# Patient Record
Sex: FEMALE | Race: WHITE | NOT HISPANIC OR LATINO | Employment: OTHER | URBAN - METROPOLITAN AREA
[De-identification: names, ages, dates, MRNs, and addresses within clinical notes are randomized per-mention and may not be internally consistent; named-entity substitution may affect disease eponyms.]

---

## 2022-08-04 ENCOUNTER — DOCUMENTATION ONLY (OUTPATIENT)
Dept: ONCOLOGY | Facility: CLINIC | Age: 67
End: 2022-08-04

## 2022-08-04 ENCOUNTER — PATIENT OUTREACH (OUTPATIENT)
Dept: ONCOLOGY | Facility: CLINIC | Age: 67
End: 2022-08-04

## 2022-08-04 ENCOUNTER — PRE VISIT (OUTPATIENT)
Dept: ONCOLOGY | Facility: CLINIC | Age: 67
End: 2022-08-04

## 2022-08-04 ENCOUNTER — TRANSCRIBE ORDERS (OUTPATIENT)
Dept: OTHER | Age: 67
End: 2022-08-04

## 2022-08-04 DIAGNOSIS — C50.919 BREAST CANCER (H): Primary | ICD-10-CM

## 2022-08-04 NOTE — PROGRESS NOTES
New Patient Oncology Nurse Navigator Note     Referring provider: Self     Referring Clinic/Organization: 2nd opinion      Referred to (specialty:) Medical Oncology and Cancer Surgery     Requested provider (if applicable): Dr. Domi Mcbride     Date Referral Received: August 4, 2022     Evaluation for:  Breast cancer     Clinical History (per Nurse review of records provided):      Patient had bilateral screening mammogram with [facility] on 7/23/22 and an asymmetry with indeterminate calcifications in the right breast at the 3 o'clock position at anterior depth. Diagnostic imaging followed on 7/27 and right breast tomosynthesis and magnification views confirm an irregular mass at 2:00 position anteriorly with pleomorphic calcifications. Additionally, mild architectural distortion at 12:00 position is noted more superior to the mass at 2:00 position.     Imaging directed right breast ultrasound demonstrates an irregular hypoechoic mass at 2:00 position subareolar measuring 1.7 x 1.1 x 1.4 cm. Approximately 1.3 cm superiorly and laterally, irregular hypoechoic tissue/mass at 12:00 position 2 cm from the nipple measures 0.5 x 0.3 x 0.4 cm, technically indeterminate. No right axillary adenopathy confirmed by ultrasound.      7/27/22 -   A.  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     B.  Breast, right 2:00, ultrasound guided needle core biopsies:    Invasive ductal carcinoma, grade 3 of 3.    Associated ductal carcinoma in Situ (DCIS), nuclear grade 3 of 3 with central comedonecrosis and microcalcifications.    Estrogen Receptor:         Negative          Progesterone Receptor:  Negative       HER2/sanjay - IHC:             Positive (Score 3+)    The larger tumor is DCIS.  The smaller tumor is IDC ER/IA negative HER2 positive. IDC was 0.5 cm on imaging.    8/3/22 - Bilateral Breast MRI  RIGHT BREAST:   1. There is a 4.1 x  3.1 x 2.8 cm (AP x TR x SI) distribution of abnormal mass and nonmass enhancement in the right breast 12:00-2:00 positions anterior to posterior depth at site of 2 site of biopsy-proven malignancy. The majority of this enhancement is nonmass enhancement and is suspicious for DCIS.   2. Within the abnormal enhancement is a 1.2 x 1.5 x 1.2 cm (AP x TR x SI) enhancing mass with washout kinetics 2:00 subareolar position with internal marking clip at site of invasive mass and DCIS.   3. Marking clip at the 12:00 position anterior depth brackets the anterior inferior extent of nonmass enhancement at site of biopsy-proven DCIS. The anterior inferior edge of the enhancement is approximately 1.3 cm from the nipple.   4. Extending posteriorly from the biopsy-proven malignant mass at the 2:00 position is a 1.5 cm length distribution of nonmass enhancement suspicious for DCIS.   5. No other suspicious enhancement.     LEFT BREAST:   1. There is a 0.4 x 0.4 x 0.5 cm (AP x TR x SI) markedly T2 hyperintense enhancing masslike finding with no discernible kinetics on CAD in the 9:30 o'clock retroareolar position middle depth.   2. Otherwise unremarkable.     1. Recommend second look ultrasound and spot tomosynthesis of the left breast with attention to the 9:30 retroareolar position middle depth. If a suspicious finding is not identified at this location, this enhancement could represent a benign focus of enhancement in 6 month follow-up breast MRI after therapy could be considered.   2. 2nd look biopsy Yes     8/10/22 -     A.  Breast, left, 12 o'clock ultrasound, needle core biopsy:      Fibroadenoma    After meeting with Dr. Liliam Uribe patient now is getting a medical oncology consult from Dr. Valdes as well.    Patient met with Syed Mosley of [facility] for genetic counseling on 8/11/22 and chose to proceed with testing via [facility]'s Breast Cancer STAT panel (9 genes) with reflex to complete the Multi-Cancer Panel (84 genes).  STAT results will be available within 2 weeks from date of blood draw with complete results available within 3 additional weeks. Results subsequently reported as follows:  Breast Cancer STAT panel (9 genes) with reflex to complete the Multi-Cancer Panel (84 genes) via [facility]. See test report for details regarding genes analyzed and testing methodologies.  RESULT: NEGATIVE FOR CLINICALLY-ACTIONABLE VARIANTS  No clinically-actionable (pathogenic) mutations were detected in the genes analyzed.  A variant of uncertain significance was detected in the RECQL4 gene. The specific variant identified was: c.1159G>A ([id number]). No laboratory classifies this variant as clinically-actionable in the national database ClinVar.     Records Location: Care Everywhere, Media and See Bookmarked material     Records Needed:   All breast imaging for past 5 years including 8/3/22 breast MRI   Path review for 7/27/22 right breast biopsies Case: [id number]    Telephoned and spoke with Janelle. A friend of hers is a patient of Dr. Mcbride and recommended her highly.  Patient met with Dr. Darlene Sánchez of [facility] on 8/4.  A breast MRI took place on 8/3.  She will be meeting with Dr. Liliam Uribe on 8/9.  Informed her of the first available appointments for Dr. Mcbride (8/29) and Dr. Dee (8/19). She asks to for Dr. Mcbride appointment to be moved up if at all possible. Per Dr. Uribe's 8/9 dictation, patient is also meeting with Dr. Valdes for medical oncology opinion (8/22) and Dr. Juan Avery for plastics on 8/18.    Writer received referral, reviewed for appropriate plan, and call transferred to New Patient Scheduling for completion.

## 2022-08-04 NOTE — TELEPHONE ENCOUNTER
Action 2022 3:21 PM ABT   Action Taken Slides from [facility] received and taken to 5th floor path lab for review.     Action 2022 1:49 PM ABT   Action Taken Images from  received, fax sent to  imaging team to resolve US and Mammos    3:21 PM  Mihaela from [facility] called and states that slides were sent to another facility on 22. May be a while til slides are sent here. Images from  resolved to PACS     Action 2022 9:20 AM ABT   Action Taken Called and spoke to patient, she states that she was seen with PN, had breast MR 22 and has onc appt 22. Pt gave verbal consent to update recs. Patient has only been dealing with issue for the last week     RECORDS STATUS - BREAST    RECORDS REQUESTED FROM: [facility]   DATE REQUESTED: 22   NOTES DETAILS STATUS   OFFICE NOTE from referring provider Self-2nd Opinion    OFFICE NOTE from medical oncologist -  22: Dr. Darlene Sánchez   MEDICATION LIST -    LABS     PATHOLOGY REPORTS  (Tissue diagnosis, Stage, ER/NM percentage positive and intensity of staining, HER2 IHC, FISH, and all biopsies from breast and any distant metastasis)                 Req -[facility]  [employer] 22: [id number]   IMAGING (NEED IMAGES & REPORT)     MRI PACS 22: MR Breast   MAMMO PACS 22-09   ULTRASOUND PACS 22: US Breast

## 2022-08-04 NOTE — PROGRESS NOTES
Action    Action Taken 8/4/2022 9:03AM DAVID   Warm Transfer From Scheduling     I spoke to Pt Janelle Ph: [phone]    I sent Naomi a teams message to reach out to pt.

## 2022-08-25 NOTE — TELEPHONE ENCOUNTER
Action 2022 12:50 PM ABT   Action Taken Slides from [facility] received and taken to 5th floor path lab for review     RECORDS STATUS - BREAST    RECORDS REQUESTED FROM: [facility]   DATE REQUESTED: 22   NOTES DETAILS STATUS   OFFICE NOTE from referring provider Self-2nd Opinion    OFFICE NOTE from medical oncologist [facility]/ 22: Dr. Janay Valdes  22: Dr. gavin titus   OFFICE NOTE from surgeon [facility] 22: Dr. Liliam Uribe   MEDICATION LIST [facility]    LABS     PATHOLOGY REPORTS  (Tissue diagnosis, Stage, ER/NC percentage positive and intensity of staining, HER2 IHC, FISH, and all biopsies from breast and any distant metastasis)                 [facility]  [employer] 08/10/22: [id number]    22: [id number] (Received  and sent for consult)   IMAGING (NEED IMAGES & REPORT)     MRI PACS 22: MR Breast   MAMMO PACS HP:  08/10/22    07/27/22-09   ULTRASOUND PACS HP:  08/10/22: US Breast    22: US Breast

## 2022-08-26 ENCOUNTER — LAB REQUISITION (OUTPATIENT)
Dept: LAB | Facility: CLINIC | Age: 67
End: 2022-08-26
Payer: COMMERCIAL

## 2022-08-31 ENCOUNTER — LAB REQUISITION (OUTPATIENT)
Dept: LAB | Facility: CLINIC | Age: 67
End: 2022-08-31
Payer: COMMERCIAL

## 2022-08-31 NOTE — PROGRESS NOTES
Telephoned and left voice message for patient informing her Dr. Mcbride is willing to see her 9/1 at 7:00am.  Requested call back as soon as possible to confirm her interest in this.  Partly [employer] message also sent. Patient returned call and she will not be available on 9/1.  She will be leaving the country until her return on 9/18.

## 2022-09-05 LAB
PATH REPORT.COMMENTS IMP SPEC: ABNORMAL
PATH REPORT.COMMENTS IMP SPEC: ABNORMAL
PATH REPORT.COMMENTS IMP SPEC: NORMAL
PATH REPORT.COMMENTS IMP SPEC: NORMAL
PATH REPORT.COMMENTS IMP SPEC: YES
PATH REPORT.FINAL DX SPEC: ABNORMAL
PATH REPORT.FINAL DX SPEC: NORMAL
PATH REPORT.GROSS SPEC: ABNORMAL
PATH REPORT.GROSS SPEC: NORMAL
PATH REPORT.MICROSCOPIC SPEC OTHER STN: ABNORMAL
PATH REPORT.MICROSCOPIC SPEC OTHER STN: NORMAL
PATH REPORT.RELEVANT HX SPEC: ABNORMAL
PATH REPORT.RELEVANT HX SPEC: ABNORMAL
PATH REPORT.RELEVANT HX SPEC: NORMAL
PATH REPORT.RELEVANT HX SPEC: NORMAL
PATH REPORT.SITE OF ORIGIN SPEC: ABNORMAL
PATH REPORT.SITE OF ORIGIN SPEC: NORMAL

## 2022-09-05 PROCEDURE — 88321 CONSLTJ&REPRT SLD PREP ELSWR: CPT | Performed by: PATHOLOGY

## 2022-09-19 ENCOUNTER — PRE VISIT (OUTPATIENT)
Dept: ONCOLOGY | Facility: CLINIC | Age: 67
End: 2022-09-19

## 2022-09-19 ENCOUNTER — VIRTUAL VISIT (OUTPATIENT)
Dept: ONCOLOGY | Facility: CLINIC | Age: 67
End: 2022-09-19
Payer: MEDICARE

## 2022-09-19 DIAGNOSIS — Z17.1 MALIGNANT NEOPLASM OF OVERLAPPING SITES OF RIGHT BREAST IN FEMALE, ESTROGEN RECEPTOR NEGATIVE (H): Primary | ICD-10-CM

## 2022-09-19 DIAGNOSIS — C50.811 MALIGNANT NEOPLASM OF OVERLAPPING SITES OF RIGHT BREAST IN FEMALE, ESTROGEN RECEPTOR NEGATIVE (H): Primary | ICD-10-CM

## 2022-09-19 PROCEDURE — G0463 HOSPITAL OUTPT CLINIC VISIT: HCPCS | Mod: PN,RTG | Performed by: INTERNAL MEDICINE

## 2022-09-19 PROCEDURE — 99204 OFFICE O/P NEW MOD 45 MIN: CPT | Mod: 95 | Performed by: INTERNAL MEDICINE

## 2022-09-19 RX ORDER — FLUOXETINE 20 MG/5ML
SOLUTION ORAL
COMMUNITY
Start: 2019-09-14

## 2022-09-19 RX ORDER — LACTOBACILLUS RHAMNOSUS GG 10B CELL
1 CAPSULE ORAL 2 TIMES DAILY
COMMUNITY

## 2022-09-19 RX ORDER — FLUOXETINE 20 MG/1
20 TABLET ORAL
COMMUNITY
Start: 2022-08-05

## 2022-09-19 RX ORDER — PAROXETINE 10 MG/5ML
3 SUSPENSION ORAL
COMMUNITY

## 2022-09-19 RX ORDER — CHLORAL HYDRATE 500 MG
1 CAPSULE ORAL
COMMUNITY

## 2022-09-19 NOTE — PROGRESS NOTES
Janelle is a 67 year old who is being evaluated via a billable video visit.      How would you like to obtain your AVS? MyChart  If the video visit is dropped, the invitation should be resent by: Send to e-mail at: [email or web address]  Will anyone else be joining your video visit? Juliana Cordero VF    Video-Visit Details    Video Start Time: 1:05 PM  Type of service:  Video Visit  Video End Time: 1:41 PM  Originating Location (pt. Location): Home  Distant Location (provider location):  [facility]   Platform used for Video Visit: Other: Epic     September 18, 2022    Here for consultation and second opinion regarding new breast cancer    Oncology History:    Janelle Baca is a 67 year old presenting for second opinion regarding new breast cancer. She had bilateral screening mammogram with [facility] on 7/23/22 and an asymmetry with indeterminate calcifications in the right breast at the 3 o'clock position at anterior depth. Diagnostic imaging followed on 7/27 and right breast tomosynthesis and magnification views confirm an irregular mass at 2:00 position anteriorly with pleomorphic calcifications. Additionally, mild architectural distortion at 12:00 position is noted more superior to the mass at 2:00 position.     Imaging directed right breast ultrasound demonstrates an irregular hypoechoic mass at 2:00 position subareolar measuring 1.7 x 1.1 x 1.4 cm. Approximately 1.3 cm superiorly and laterally, irregular hypoechoic tissue/mass at 12:00 position 2 cm from the nipple measures 0.5 x 0.3 x 0.4 cm, technically indeterminate. No right axillary adenopathy confirmed by ultrasound.      Biopsy of the right breast mass at the 12 o'clock position demonstrated ductal carcinoma in situ, grade 3. Pathology from the mass at the 2 o'clock position right breast showed grade 3 invasive ductal carcinoma. ER/IA negative, HER2 positive.     Breast MRI was performed 8/3/2022. There is a  4.1 x 3.1 x 2.8 cm area of abnormal mass and non-mass enhancement in the right breast from the 12 to 2 o'clock position. This is the site of the 2 biopsy-proven cancers. The majority of this is non-mass enhancement or suspicious for DCIS. Within this enhancement is a solid enhancing mass consistent with the subareolar cancer at the 2 o'clock position that measures 1.2 x 1.5 x 1.2 cm. In the left breast there is a 4 x 4 x 5 mm enhancing masslike finding with no significant kinetics on CAT at the 9:30 position retroareolar location. Left breast lesion pathology 08/10/2022 consistent with fibroadenoma.    Janelle has been following with Dr. Valdes for medical oncology at [facility]. She has had no therapy or surgery, and is planning to begin THP Friday 9/23.    HPI:  Janelle's friend is a patient of Dr. Mcbride's and so she wanted to consult Dr. Mcbride for a second opinion before beginning therapy this Friday. She saw Dr. Valdes at [facility] who recommended neoadjuvant Taxol/Herceptin/Pertuzumab for 12 weeks followed by repeat MRI to evaluate. They will then decide lumpectomy + radiation vs mastectomy at that time.    Janelle feels comfortable with this plan but wants second opinion before beginning chemo. She has been feeling well and just went on a hiking trip with her friend to [place], although unfortunately her friend got COVID. Denies new breast lumps or pain, pain elsewhere, unintentional weight loss, fevers, chest pain, dyspnea, diarrhea, nausea/vomiting. 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 She is also concerned about losing her hair. She took Paxil for 20 years and has read that it may be related to increased breast cancer risk.    Hormonal history: Menarche 17. Menopause ~60. Nulliparous. OCPs x 2 months in 1984. No postmenopausal hormone therapy. Uterus and ovaries intact. Exposure to ROBBIE in utero.    Remainder of 10-pt ROS negative except as per HPI.    PMH: None    Medications: Prozac  20mg    Family History:  Mother had breast cancer at age 50  No ovarian    Social History:  No smoking history  Previously 2 glasses wine/day, now not drinking  Goes to gym regularly  Plant based diet    Physical Exam:  GENERAL: Healthy, alert and no distress  EYES: Eyes grossly normal to inspection.  No discharge or erythema, or obvious scleral/conjunctival abnormalities.  RESP: No audible wheeze, cough, or visible cyanosis.  No visible retractions or increased work of breathing.    SKIN: Visible skin clear. No significant rash, abnormal pigmentation or lesions.  NEURO: Cranial nerves grossly intact.  Mentation and speech appropriate for age.  PSYCH: Mentation appears normal, affect normal/bright, judgement and insight intact, normal speech and appearance well-groomed.    Imaging/Pathology:  7/27/22 -   A.  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     B.  Breast, right 2:00, ultrasound guided needle core biopsies:    Invasive ductal carcinoma, grade 3 of 3.    Associated ductal carcinoma in Situ (DCIS), nuclear grade 3 of 3 with central comedonecrosis and microcalcifications.    Estrogen Receptor:         Negative          Progesterone Receptor:  Negative       HER2/sanjay - IHC:             Positive (Score 3+)    The larger tumor is DCIS.  The smaller tumor is IDC ER/HI negative HER2 positive. IDC was 0.5 cm on imaging.    8/3/22 - Bilateral Breast MRI  RIGHT BREAST:   1. There is a 4.1 x 3.1 x 2.8 cm (AP x TR x SI) distribution of abnormal mass and nonmass enhancement in the right breast 12:00-2:00 positions anterior to posterior depth at site of 2 site of biopsy-proven malignancy. The majority of this enhancement is nonmass enhancement and is suspicious for DCIS.   2. Within the abnormal enhancement is a 1.2 x 1.5 x 1.2 cm (AP x TR x SI) enhancing mass with washout kinetics 2:00 subareolar position with internal  marking clip at site of invasive mass and DCIS.   3. Marking clip at the 12:00 position anterior depth brackets the anterior inferior extent of nonmass enhancement at site of biopsy-proven DCIS. The anterior inferior edge of the enhancement is approximately 1.3 cm from the nipple.   4. Extending posteriorly from the biopsy-proven malignant mass at the 2:00 position is a 1.5 cm length distribution of nonmass enhancement suspicious for DCIS.   5. No other suspicious enhancement.     LEFT BREAST:   1. There is a 0.4 x 0.4 x 0.5 cm (AP x TR x SI) markedly T2 hyperintense enhancing masslike finding with no discernible kinetics on CAD in the 9:30 o'clock retroareolar position middle depth.   2. Otherwise unremarkable.     1. Recommend second look ultrasound and spot tomosynthesis of the left breast with attention to the 9:30 retroareolar position middle depth. If a suspicious finding is not identified at this location, this enhancement could represent a benign focus of enhancement in 6 month follow-up breast MRI after therapy could be considered.   2. 2nd look biopsy Yes     8/10/22 -     A.  Breast, left, 12 o'clock ultrasound, needle core biopsy:      Fibroadenoma   Patient met with Syed Mosley of [facility] for genetic counseling on 8/11/22 and chose to proceed with testing via [facility]'s Breast Cancer STAT panel (9 genes) with reflex to complete the Multi-Cancer Panel (84 genes). STAT results will be available within 2 weeks from date of blood draw with complete results available within 3 additional weeks. Results subsequently reported as follows:  Breast Cancer STAT panel (9 genes) with reflex to complete the Multi-Cancer Panel (84 genes) via [facility]. See test report for details regarding genes analyzed and testing methodologies.  RESULT: NEGATIVE FOR CLINICALLY-ACTIONABLE VARIANTS  No clinically-actionable (pathogenic) mutations were detected in the genes analyzed.  A variant of uncertain significance was detected  in the RECQL4 gene. The specific variant identified was: c.1159G>A ([id number]). No laboratory classifies this variant as clinically-actionable in the national database ClinVar.    Assessment/Plan:  Janelle Baca is a 67 y.o. postmenopausal female with Cancer Staging Malignant neoplasm of overlapping sites of right breast in female, estrogen receptor negative (HC), Staging form: Breast, AJCC 8th Edition, Clinical: Stage IIA (cT2, cN0, cM0, G3, ER-, MS-, HER2+).    Agree with Dr. Valdes's recommendation of neoadjuvant THP for 12 weeks followed by repeat MRI. Educated patient on comparable outcomes for lumpectomy + radiation vs mastectomy, and advised her to continue discussing options with her surgeon after her repeat MRI in 12 weeks. After surgery, recommend adjuvant Herceptin every 3 weeks for a year. Educated patient on side effects of chemo including hair loss and peripheral neuropathy. Recommended cold therapy on hands, feet, and scalp during infusions to minimize hair loss and neuropathy.    Patient should continue to follow with [facility] Valdes at [facility].     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 Susan Carvalho as outlined in the above note.  We discussed that she has a stage IIa right breast cancer that is estrogen receptor negative and HER2 positive.  All treatments are curative intent.  I discussed the I agree with the recommendations to pursue neoadjuvant chemotherapy using Taxol with Herceptin and Perjeta for 12 weeks.  We discussed this is given weekly in the neoadjuvant setting.  Following this neoadjuvant treatment breast MRI would be recommended.  I anticipate at that time she will be able to undergo a surgical excision.  She understands that she will need adjuvant Herceptin based therapy.  This may be used as Herceptin, Herceptin plus Perjeta, or Kadcyla.  The choices of these medications will depend upon her  response to neoadjuvant therapy based on the Chantel study.  These therapies will continue in the adjuvant setting as an IV every 3 weeks to complete 1 year of therapy.    She has had chemotherapy teaching and is scheduled to begin this later this week.    I reviewed that surgical outcomes for individuals who have a mastectomy or have a lumpectomy or a equivalent when individuals also have a lumpectomy with radiation.  She has met with surgical oncology.    She has had genetic testing.    She is hormone receptor negative.  As result I do not recommend adjuvant endocrine therapy.    Was a pleasure to see Janelle today.  I am happy to see her back at any time.  At this time she will continue her care with Dr. Janay Valdes.    Domi Mcbride MD

## 2022-10-03 ENCOUNTER — HEALTH MAINTENANCE LETTER (OUTPATIENT)
Age: 67
End: 2022-10-03

## 2023-10-22 ENCOUNTER — HEALTH MAINTENANCE LETTER (OUTPATIENT)
Age: 68
End: 2023-10-22

## 2024-12-15 ENCOUNTER — HEALTH MAINTENANCE LETTER (OUTPATIENT)
Age: 69
End: 2024-12-15